# Patient Record
Sex: FEMALE | Race: WHITE | Employment: OTHER | URBAN - METROPOLITAN AREA
[De-identification: names, ages, dates, MRNs, and addresses within clinical notes are randomized per-mention and may not be internally consistent; named-entity substitution may affect disease eponyms.]

---

## 2019-04-22 ENCOUNTER — HOSPITAL ENCOUNTER (OUTPATIENT)
Dept: MRI IMAGING | Age: 69
Discharge: HOME | End: 2019-04-22
Attending: FAMILY MEDICINE
Payer: MEDICARE

## 2019-04-22 DIAGNOSIS — R15.9 INCONTINENCE OF FECES, UNSPECIFIED FECAL INCONTINENCE TYPE: ICD-10-CM

## 2019-04-22 DIAGNOSIS — M51.36 DDD (DEGENERATIVE DISC DISEASE), LUMBAR: ICD-10-CM

## 2019-04-22 PROCEDURE — 72148 MRI LUMBAR SPINE W/O DYE: CPT

## 2019-04-23 NOTE — PROGRESS NOTES
Call back MRI of the lumbar spine reveals multilevel arthritic changes without spinal stenosis but some mild abutment of the left L4 nerve root noted and mild narrowing at L3-L4 and L4-L5. Keep follow-up as planned.

## 2019-05-10 ENCOUNTER — HOSPITAL ENCOUNTER (OUTPATIENT)
Dept: LAB | Age: 69
Discharge: HOME | End: 2019-05-10
Payer: MEDICARE

## 2019-05-10 DIAGNOSIS — D72.819 LEUKOPENIA, UNSPECIFIED TYPE: ICD-10-CM

## 2019-05-10 DIAGNOSIS — D64.9 ANEMIA, UNSPECIFIED TYPE: ICD-10-CM

## 2019-05-10 DIAGNOSIS — E55.9 VITAMIN D DEFICIENCY: ICD-10-CM

## 2019-05-10 LAB
ALBUMIN SERPL-MCNC: 4 G/DL (ref 3.2–4.6)
ALBUMIN/GLOB SERPL: 1.3 {RATIO} (ref 1.2–3.5)
ALP SERPL-CCNC: 49 U/L (ref 50–136)
ALT SERPL-CCNC: 15 U/L (ref 12–65)
ANION GAP SERPL CALC-SCNC: 6 MMOL/L (ref 7–16)
AST SERPL-CCNC: 14 U/L (ref 15–37)
BASOPHILS # BLD: 0 K/UL (ref 0–0.2)
BASOPHILS NFR BLD: 1 % (ref 0–2)
BILIRUB SERPL-MCNC: 0.2 MG/DL (ref 0.2–1.1)
BUN SERPL-MCNC: 14 MG/DL (ref 8–23)
CALCIUM SERPL-MCNC: 9.2 MG/DL (ref 8.3–10.4)
CHLORIDE SERPL-SCNC: 107 MMOL/L (ref 98–107)
CO2 SERPL-SCNC: 28 MMOL/L (ref 21–32)
CREAT SERPL-MCNC: 1.09 MG/DL (ref 0.6–1)
DIFFERENTIAL METHOD BLD: ABNORMAL
EOSINOPHIL # BLD: 0 K/UL (ref 0–0.8)
EOSINOPHIL NFR BLD: 1 % (ref 0.5–7.8)
ERYTHROCYTE [DISTWIDTH] IN BLOOD BY AUTOMATED COUNT: 12.8 % (ref 11.9–14.6)
FERRITIN SERPL-MCNC: 38 NG/ML (ref 8–388)
GLOBULIN SER CALC-MCNC: 3.2 G/DL (ref 2.3–3.5)
GLUCOSE SERPL-MCNC: 84 MG/DL (ref 65–100)
HCT VFR BLD AUTO: 37.5 % (ref 35.8–46.3)
HGB BLD-MCNC: 12.6 G/DL (ref 11.7–15.4)
IMM GRANULOCYTES # BLD AUTO: 0 K/UL (ref 0–0.5)
IMM GRANULOCYTES NFR BLD AUTO: 0 % (ref 0–5)
LYMPHOCYTES # BLD: 1.4 K/UL (ref 0.5–4.6)
LYMPHOCYTES NFR BLD: 38 % (ref 13–44)
MCH RBC QN AUTO: 29.9 PG (ref 26.1–32.9)
MCHC RBC AUTO-ENTMCNC: 33.6 G/DL (ref 31.4–35)
MCV RBC AUTO: 89.1 FL (ref 79.6–97.8)
MONOCYTES # BLD: 0.4 K/UL (ref 0.1–1.3)
MONOCYTES NFR BLD: 12 % (ref 4–12)
NEUTS SEG # BLD: 1.8 K/UL (ref 1.7–8.2)
NEUTS SEG NFR BLD: 48 % (ref 43–78)
NRBC # BLD: 0 K/UL (ref 0–0.2)
PLATELET # BLD AUTO: 211 K/UL (ref 150–450)
PMV BLD AUTO: 10.1 FL (ref 9.4–12.3)
POTASSIUM SERPL-SCNC: 4.3 MMOL/L (ref 3.5–5.1)
PROT SERPL-MCNC: 7.2 G/DL (ref 6.3–8.2)
RBC # BLD AUTO: 4.21 M/UL (ref 4.05–5.25)
SODIUM SERPL-SCNC: 141 MMOL/L (ref 136–145)
WBC # BLD AUTO: 3.7 K/UL (ref 4.3–11.1)

## 2019-05-10 PROCEDURE — 82652 VIT D 1 25-DIHYDROXY: CPT

## 2019-05-10 PROCEDURE — 84165 PROTEIN E-PHORESIS SERUM: CPT

## 2019-05-10 PROCEDURE — 86334 IMMUNOFIX E-PHORESIS SERUM: CPT

## 2019-05-10 PROCEDURE — 85025 COMPLETE CBC W/AUTO DIFF WBC: CPT

## 2019-05-10 PROCEDURE — 80053 COMPREHEN METABOLIC PANEL: CPT

## 2019-05-10 PROCEDURE — 36415 COLL VENOUS BLD VENIPUNCTURE: CPT

## 2019-05-10 PROCEDURE — 82728 ASSAY OF FERRITIN: CPT

## 2019-05-13 LAB
1,25(OH)2D3 SERPL-MCNC: 18.3 PG/ML (ref 19.9–79.3)
ALBUMIN SERPL ELPH-MCNC: 4.04 G/DL (ref 3.2–5.6)
ALBUMIN/GLOB SERPL: 1.4 {RATIO}
ALPHA1 GLOB SERPL ELPH-MCNC: 0.3 G/DL (ref 0.1–0.4)
ALPHA2 GLOB SERPL ELPH-MCNC: 0.86 G/DL (ref 0.4–1.2)
B-GLOBULIN SERPL QL ELPH: 1.01 G/DL (ref 0.6–1.3)
GAMMA GLOB MFR SERPL ELPH: 0.79 G/DL (ref 0.5–1.6)
IGA SERPL-MCNC: 113 MG/DL (ref 85–499)
IGG SERPL-MCNC: 621 MG/DL (ref 610–1616)
IGM SERPL-MCNC: 34 MG/DL (ref 35–242)
M PROTEIN SERPL ELPH-MCNC: ABNORMAL G/DL
PROT PATTERN SERPL ELPH-IMP: ABNORMAL
PROT PATTERN SPEC IFE-IMP: ABNORMAL
PROT SERPL-MCNC: 7 G/DL (ref 6.3–8.2)

## 2019-05-14 PROBLEM — E55.9 VITAMIN D DEFICIENCY: Status: ACTIVE | Noted: 2019-05-14

## 2020-01-09 PROBLEM — K58.2 IRRITABLE BOWEL SYNDROME WITH BOTH CONSTIPATION AND DIARRHEA: Status: ACTIVE | Noted: 2020-01-09

## 2020-01-09 PROBLEM — N39.41 URGE INCONTINENCE: Status: ACTIVE | Noted: 2020-01-09

## 2020-01-09 PROBLEM — N81.10 BLADDER PROLAPSE, FEMALE, ACQUIRED: Status: ACTIVE | Noted: 2020-01-09

## 2020-06-15 PROBLEM — J30.9 ALLERGIC RHINITIS: Status: ACTIVE | Noted: 2020-06-15

## 2020-06-15 PROBLEM — M51.36 DDD (DEGENERATIVE DISC DISEASE), LUMBAR: Status: ACTIVE | Noted: 2020-06-15

## 2020-06-15 PROBLEM — E87.6 HYPOKALEMIA: Status: ACTIVE | Noted: 2020-06-15

## 2020-06-15 PROBLEM — M25.562 CHRONIC PAIN OF LEFT KNEE: Status: ACTIVE | Noted: 2020-06-15

## 2020-06-15 PROBLEM — G89.29 CHRONIC PAIN OF LEFT KNEE: Status: ACTIVE | Noted: 2020-06-15

## 2021-06-08 PROBLEM — L20.81 ATOPIC NEURODERMATITIS: Status: ACTIVE | Noted: 2021-06-08

## 2021-06-08 PROBLEM — K44.9 HIATAL HERNIA: Status: ACTIVE | Noted: 2021-06-08

## 2021-06-08 PROBLEM — M19.042 PRIMARY OSTEOARTHRITIS OF BOTH HANDS: Status: ACTIVE | Noted: 2021-06-08

## 2021-06-08 PROBLEM — M25.50 POLYARTHRALGIA: Status: ACTIVE | Noted: 2021-06-08

## 2021-06-08 PROBLEM — M19.041 PRIMARY OSTEOARTHRITIS OF BOTH HANDS: Status: ACTIVE | Noted: 2021-06-08

## 2021-06-08 PROBLEM — N81.10 BLADDER PROLAPSE, FEMALE, ACQUIRED: Status: RESOLVED | Noted: 2020-01-09 | Resolved: 2021-06-08

## 2021-06-08 PROBLEM — K59.09 CHRONIC CONSTIPATION: Status: ACTIVE | Noted: 2021-06-08

## 2022-03-18 PROBLEM — K59.09 CHRONIC CONSTIPATION: Status: ACTIVE | Noted: 2021-06-08

## 2022-03-18 PROBLEM — L20.81 ATOPIC NEURODERMATITIS: Status: ACTIVE | Noted: 2021-06-08

## 2022-03-18 PROBLEM — E87.6 HYPOKALEMIA: Status: ACTIVE | Noted: 2020-06-15

## 2022-03-18 PROBLEM — J30.9 ALLERGIC RHINITIS: Status: ACTIVE | Noted: 2020-06-15

## 2022-03-18 PROBLEM — K58.2 IRRITABLE BOWEL SYNDROME WITH BOTH CONSTIPATION AND DIARRHEA: Status: ACTIVE | Noted: 2020-01-09

## 2022-03-19 PROBLEM — M25.562 CHRONIC PAIN OF LEFT KNEE: Status: ACTIVE | Noted: 2020-06-15

## 2022-03-19 PROBLEM — M19.041 PRIMARY OSTEOARTHRITIS OF BOTH HANDS: Status: ACTIVE | Noted: 2021-06-08

## 2022-03-19 PROBLEM — E55.9 VITAMIN D DEFICIENCY: Status: ACTIVE | Noted: 2019-05-14

## 2022-03-19 PROBLEM — M19.042 PRIMARY OSTEOARTHRITIS OF BOTH HANDS: Status: ACTIVE | Noted: 2021-06-08

## 2022-03-19 PROBLEM — K44.9 HIATAL HERNIA: Status: ACTIVE | Noted: 2021-06-08

## 2022-03-19 PROBLEM — M51.36 DDD (DEGENERATIVE DISC DISEASE), LUMBAR: Status: ACTIVE | Noted: 2020-06-15

## 2022-03-19 PROBLEM — G89.29 CHRONIC PAIN OF LEFT KNEE: Status: ACTIVE | Noted: 2020-06-15

## 2022-03-20 PROBLEM — M25.50 POLYARTHRALGIA: Status: ACTIVE | Noted: 2021-06-08

## 2022-03-20 PROBLEM — N39.41 URGE INCONTINENCE: Status: ACTIVE | Noted: 2020-01-09

## 2022-05-24 ENCOUNTER — TELEPHONE (OUTPATIENT)
Dept: FAMILY MEDICINE CLINIC | Facility: CLINIC | Age: 72
End: 2022-05-24

## 2022-05-24 NOTE — TELEPHONE ENCOUNTER
Pt called and asked to be called back. Pt states that she is in [address] [facility] for a heart attack and seizures. Requesting to move her appointment up.  Tried calling pt and had to leave message to call back

## 2022-05-25 NOTE — TELEPHONE ENCOUNTER
Pt states that she is in [address] [place] [address] and does not know when she will be discharged and wanted an appointment. I notified pt that we can not make a Hospital Follow up until we know that she is being discharged.

## 2022-06-09 ENCOUNTER — HOME HEALTH ADMISSION (OUTPATIENT)
Dept: HOME HEALTH SERVICES | Facility: HOME HEALTH | Age: 72
End: 2022-06-09

## 2022-06-13 ENCOUNTER — TELEPHONE (OUTPATIENT)
Dept: FAMILY MEDICINE CLINIC | Facility: CLINIC | Age: 72
End: 2022-06-13

## 2022-06-13 NOTE — TELEPHONE ENCOUNTER
----- Message from Karlos Nagel sent at 6/13/2022 10:30 AM EDT -----  Subject: Message to Provider    QUESTIONS  Information for Provider? Pt is requesting for doctor Reardon to get   medical records from [facility] . Pt is now at [facility]   Acute rehab facility. Lance Spears ---------------------------------------------------------------------------  --------------  [place] INFO  What is the best way for the office to contact you? OK to leave message on   voicemail  Preferred Call Back Phone Number? [phone]  ---------------------------------------------------------------------------  --------------  SCRIPT ANSWERS  Relationship to Patient?  Self

## 2022-06-14 NOTE — TELEPHONE ENCOUNTER
Spoke with pt and let her know that the her records are in the system. Pt wanted to let Terrence Perez know that she is in Rehab at the time.

## 2022-06-15 ENCOUNTER — TELEPHONE (OUTPATIENT)
Dept: CARDIOLOGY CLINIC | Age: 72
End: 2022-06-15

## 2022-06-15 NOTE — TELEPHONE ENCOUNTER
Patient admitted to [facility]  11. Bipolar disorder: QT was prolonged.  Seroquel discontinued.  She was started on Depakote instead.  Xanax was discontinued. Marylen Harden is started on Klonopin.  Needs to be weaned from benzodiazepines long-term. - follow up at rehab in 5-7 days, repeat EKG, if QTc improving, may consider to restart Seroquel , follow psych outpt.

## 2022-06-21 ENCOUNTER — TELEPHONE (OUTPATIENT)
Dept: FAMILY MEDICINE CLINIC | Facility: CLINIC | Age: 72
End: 2022-06-21

## 2022-06-21 NOTE — TELEPHONE ENCOUNTER
Pt was just release from [facility]. Pt is needing an appointment next week. Can pt see Lucian Sandy for this?

## 2022-07-07 ENCOUNTER — OFFICE VISIT (OUTPATIENT)
Dept: FAMILY MEDICINE CLINIC | Facility: CLINIC | Age: 72
End: 2022-07-07
Payer: COMMERCIAL

## 2022-07-07 VITALS
SYSTOLIC BLOOD PRESSURE: 96 MMHG | BODY MASS INDEX: 24.48 KG/M2 | OXYGEN SATURATION: 95 % | DIASTOLIC BLOOD PRESSURE: 51 MMHG | WEIGHT: 133 LBS | TEMPERATURE: 97.9 F | RESPIRATION RATE: 18 BRPM | HEIGHT: 62 IN | HEART RATE: 109 BPM

## 2022-07-07 DIAGNOSIS — I25.10 CORONARY ARTERY DISEASE INVOLVING NATIVE CORONARY ARTERY OF NATIVE HEART WITHOUT ANGINA PECTORIS: ICD-10-CM

## 2022-07-07 DIAGNOSIS — K58.2 IRRITABLE BOWEL SYNDROME WITH BOTH CONSTIPATION AND DIARRHEA: ICD-10-CM

## 2022-07-07 DIAGNOSIS — E78.5 DYSLIPIDEMIA: ICD-10-CM

## 2022-07-07 DIAGNOSIS — I21.4 NSTEMI (NON-ST ELEVATED MYOCARDIAL INFARCTION) (HCC): Primary | ICD-10-CM

## 2022-07-07 DIAGNOSIS — R33.8 ACUTE URINARY RETENTION: ICD-10-CM

## 2022-07-07 DIAGNOSIS — E87.1 HYPONATREMIA: ICD-10-CM

## 2022-07-07 DIAGNOSIS — F31.9 BIPOLAR AFFECTIVE DISORDER, REMISSION STATUS UNSPECIFIED (HCC): ICD-10-CM

## 2022-07-07 DIAGNOSIS — E55.9 VITAMIN D DEFICIENCY: ICD-10-CM

## 2022-07-07 DIAGNOSIS — G40.909 SEIZURE DISORDER (HCC): ICD-10-CM

## 2022-07-07 DIAGNOSIS — H04.122 DRY EYE OF LEFT SIDE: ICD-10-CM

## 2022-07-07 DIAGNOSIS — R29.6 FREQUENT FALLS: ICD-10-CM

## 2022-07-07 DIAGNOSIS — R15.9 INCONTINENCE OF FECES, UNSPECIFIED FECAL INCONTINENCE TYPE: ICD-10-CM

## 2022-07-07 DIAGNOSIS — F41.9 ANXIETY: ICD-10-CM

## 2022-07-07 DIAGNOSIS — E87.6 HYPOKALEMIA: ICD-10-CM

## 2022-07-07 DIAGNOSIS — Z86.16 PERSONAL HISTORY OF COVID-19: ICD-10-CM

## 2022-07-07 DIAGNOSIS — I10 ESSENTIAL HYPERTENSION: ICD-10-CM

## 2022-07-07 DIAGNOSIS — K21.00 GASTROESOPHAGEAL REFLUX DISEASE WITH ESOPHAGITIS WITHOUT HEMORRHAGE: ICD-10-CM

## 2022-07-07 DIAGNOSIS — R32 URINARY INCONTINENCE, UNSPECIFIED TYPE: ICD-10-CM

## 2022-07-07 LAB
ANION GAP SERPL CALC-SCNC: 7 MMOL/L (ref 7–16)
BUN SERPL-MCNC: 13 MG/DL (ref 8–23)
CALCIUM SERPL-MCNC: 9.7 MG/DL (ref 8.3–10.4)
CHLORIDE SERPL-SCNC: 106 MMOL/L (ref 98–107)
CO2 SERPL-SCNC: 26 MMOL/L (ref 21–32)
CREAT SERPL-MCNC: 0.8 MG/DL (ref 0.6–1)
GLUCOSE SERPL-MCNC: 119 MG/DL (ref 65–100)
POTASSIUM SERPL-SCNC: 4.1 MMOL/L (ref 3.5–5.1)
SODIUM SERPL-SCNC: 139 MMOL/L (ref 136–145)

## 2022-07-07 PROCEDURE — 99215 OFFICE O/P EST HI 40 MIN: CPT | Performed by: FAMILY MEDICINE

## 2022-07-07 PROCEDURE — 1123F ACP DISCUSS/DSCN MKR DOCD: CPT | Performed by: FAMILY MEDICINE

## 2022-07-07 RX ORDER — CLONAZEPAM 1 MG/1
1 TABLET ORAL 2 TIMES DAILY
COMMUNITY
Start: 2022-06-10 | End: 2022-07-07 | Stop reason: SDUPTHER

## 2022-07-07 RX ORDER — DICYCLOMINE HYDROCHLORIDE 10 MG/1
CAPSULE ORAL
COMMUNITY
End: 2022-07-07 | Stop reason: SDUPTHER

## 2022-07-07 RX ORDER — ACETAMINOPHEN 325 MG/1
650 TABLET ORAL EVERY 8 HOURS PRN
COMMUNITY
Start: 2022-06-10

## 2022-07-07 RX ORDER — POTASSIUM CHLORIDE 750 MG/1
TABLET, FILM COATED, EXTENDED RELEASE ORAL
Qty: 90 TABLET | Refills: 3 | Status: SHIPPED | OUTPATIENT
Start: 2022-07-07

## 2022-07-07 RX ORDER — CLONAZEPAM 1 MG/1
1 TABLET ORAL 2 TIMES DAILY
Qty: 60 TABLET | Refills: 5 | Status: SHIPPED | OUTPATIENT
Start: 2022-07-07 | End: 2022-07-11 | Stop reason: SDUPTHER

## 2022-07-07 RX ORDER — CHOLECALCIFEROL (VITAMIN D3) 125 MCG
5 CAPSULE ORAL
COMMUNITY
Start: 2022-06-10

## 2022-07-07 RX ORDER — ZONISAMIDE 100 MG/1
200 CAPSULE ORAL 2 TIMES DAILY
COMMUNITY
Start: 2022-06-10 | End: 2022-07-08 | Stop reason: SDUPTHER

## 2022-07-07 RX ORDER — DICYCLOMINE HYDROCHLORIDE 10 MG/1
10 CAPSULE ORAL 2 TIMES DAILY
Qty: 180 CAPSULE | Refills: 3 | Status: SHIPPED | OUTPATIENT
Start: 2022-07-07

## 2022-07-07 RX ORDER — ERGOCALCIFEROL (VITAMIN D2) 1250 MCG
50000 CAPSULE ORAL
Qty: 12 CAPSULE | Refills: 3 | Status: SHIPPED | OUTPATIENT
Start: 2022-07-07

## 2022-07-07 RX ORDER — LISINOPRIL 5 MG/1
5 TABLET ORAL DAILY
Qty: 90 TABLET | Refills: 3 | Status: SHIPPED | OUTPATIENT
Start: 2022-07-07

## 2022-07-07 RX ORDER — ASPIRIN 81 MG/1
81 TABLET ORAL DAILY
COMMUNITY
Start: 2022-06-11 | End: 2022-07-11

## 2022-07-07 RX ORDER — ROSUVASTATIN CALCIUM 20 MG/1
20 TABLET, COATED ORAL NIGHTLY
Qty: 90 TABLET | Refills: 3 | Status: SHIPPED | OUTPATIENT
Start: 2022-07-07

## 2022-07-07 RX ORDER — OMEPRAZOLE 40 MG/1
40 CAPSULE, DELAYED RELEASE ORAL DAILY
Qty: 90 CAPSULE | Refills: 3 | Status: SHIPPED | OUTPATIENT
Start: 2022-07-07

## 2022-07-07 NOTE — PROGRESS NOTES
[address] [facility] [address]  Phone: [phone] Fax [phone]  Yehuda Rodriguez M.D.  7/7/2022        Benny Hodges is a 66 y.o. female     HPI:  Patient is seen for Follow-Up from Hospital (Seizure that lead to heart issue, fallen 5 times before going to the ER 5/21/22, Rehab [facility] for 10 day, home for 16 days, ) and Other (Has Zina at Home coming out)  Patient is accompanied by her  today. She was admitted to [facility] on May 21 and was discharged on Madhav[facility] 10. She has been falling frequently just prior to going to the ER. On the day of the ER evaluation and hospital admission, she had fallen off the toilet without loss of consciousness. She did however have increasing altered mental status. In the ER setting she was noted to have elevated troponin with evidence for NSTEMI. Cardiology was consulted with plans for medical management only. Goal was to add ACE inhibitor and beta-blocker when blood pressure allows. Continue statin therapy started. Hypotension during hospitalization resolved as well as noted heart failure with IV fluid hydration and use of Lasix. Patient had CT of the head and spine without acute abnormalities noted. Her  had noted episodes of her staring with concern for seizure activity. Patient had EEG performed on May 24 that was noted to be nonspecific without epileptic waveforms. Neuro was consulted with concern for psychogenic seizure activity. Her prior Xanax 4 mg twice daily was changed to Klonopin taper with resulting 1 mg twice daily stabilization. She has history of underlying bipolar disorder and her Seroquel was stopped secondary to prolonged QT interval noted. EEG was repeated on 26 May and overnight that night with 45 episodes of epileptic activity documented. She had been started on Keppra initially and this was increased.   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 Her  does report that she would move her arm spasmodically and her head at times however during that EEG. With the Seroquel having been stopped, she was changed to Depakote but this resulted in hyponatremia/SIADH for which she was kept in the ICU with Depakote tapered off. She was subsequently placed on zonisamide 200 mg twice daily without further noted seizure activity by report. During hospitalization patient developed acute urinary retention with Novak catheter placed and this attempted to be removed prior to discharge but unable to successfully be done. At time of discharge to postacute rehab, she still had indwelling catheter in place. Was told she would need follow-up sodium testing as well as follow-up with nephrology and neurology. Patient was in the postacute care until 16 days ago by report. During that time in postacute care, her Novak catheter was able to be discontinued and she has been able to urinate without in and out catheterization. However she has no urinary and fecal incontinence and her  has been having her in a diaper and changing this regularly. Currently home health is coming out once weekly. Physical therapy has been discontinued and patient's  states that he has been trying to continue her cardiac rehab activities. She is ambulating some with a walker. No recent falls reported. Appetite has been fair. She and her  report that they do not want to follow-up with any further specialists. Able to reduce all doctor visits and only come here for primary care. Patient's  desires to keep her at home. He reports they have decided that she will not follow-up with cardiology, neurology, psychiatry, nephrology, etc.  He and his wife are amenable to palliative care which was mentioned by KELLE with card provided but he prefers this potentially through [facility].   Agrees for this No masses palpated. No CVA tenderness. No peripheral edema or cyanosis. Moves all extremities slowly but fairly well.  is equal bilateral.  Able to flex and extend the thighs at the hips but more difficulty against resistance. Able to flex and extend the feet at the ankles. Assessment and Plan:   Diagnosis Orders   1. NSTEMI (non-ST elevated myocardial infarction) [facility])  External Referral to Palliative Medicine   2. Seizure disorder [facility])  External Referral to Palliative Medicine   3. Urinary incontinence, unspecified type  External Referral to Palliative Medicine   4. Incontinence of feces, unspecified fecal incontinence type  External Referral to Palliative Medicine   5. Hyponatremia  Basic Metabolic Panel   6. Dry eye of left side     7. Acute urinary retention     8. Essential hypertension  lisinopril (PRINIVIL;ZESTRIL) 5 MG tablet   9. Dyslipidemia  rosuvastatin (CRESTOR) 20 MG tablet   10. Vitamin D deficiency  ergocalciferol (ERGOCALCIFEROL) 1.25 MG (08543 UT) capsule   11. Irritable bowel syndrome with both constipation and diarrhea  dicyclomine (BENTYL) 10 MG capsule   12. Bipolar affective disorder, remission status unspecified ([address]  External Referral to Palliative Medicine   13. Anxiety  clonazePAM (KLONOPIN) 1 MG tablet   14. Gastroesophageal reflux disease with esophagitis without hemorrhage  omeprazole (PRILOSEC) 40 MG delayed release capsule   15. Hypokalemia  potassium chloride (KLOR-CON) 10 MEQ extended release tablet   16. Frequent falls     17. Coronary artery disease involving native coronary artery of native heart without angina pectoris  External Referral to Palliative Medicine   18. Personal history of COVID-19       Information on cardiac rehab, epilepsy, hyponatremia, bladder training, fall prevention, esophagitis, fecal incontinence diet, dry eyes, anxiety disorder, and bipolar disorder provided.   Reviewed with patient in great detail her recent hospitalization and postacute rehab. Have referred patient for palliative care evaluation with patient and her  not desiring to follow-up with any further specialist and patient's  desiring to care for his wife at home but with significant underlying comorbidities including coronary disease, seizure disorder, incontinence of bowel and bladder, bipolar disorder, and recent electrolyte abnormalities. Did encourage patient and her  to follow-up at least with neurology but this declined at this time. Even mentioned that I am not well versed on Zonegran antiseizure medication but this reportedly working well with desire for this to be continued. Continue with cardiac rehab therapies. Refilled other medications as above. Consider addition of beta-blocker low-dose in the near future if blood pressure allows. Goal of  reportedly is to reduce total doctor office visits significantly at least no more than every 6 months secondary to the difficulty reported with coercing patient to leave the home. Furthermore discussed patient and her 's desire not to follow-up with a psychiatrist at this time as they did not desire to go back and see Dr. Lazara Rider or anyone else. Reviewed lab work from recent hospitalization and subsequently. Await repeat BMP from today specifically in regards to prior noted hyponatremia. If potassium once again low, may need to increase from 1 tablet to 2 tablets daily. If breakthrough heartburn or reflux, may need to increase Protonix back to twice daily from written once daily today. May use natural tears for dry eye. Plan reassessment here in 3 months or more quickly as needed. Spent 1 hour today in examination, evaluation, and documentation.      Discharge Meds:  Current Outpatient Medications   Medication Sig Dispense Refill    ergocalciferol (ERGOCALCIFEROL) 1.25 MG (88333 UT) capsule Take 1 capsule by mouth every 7 days 12 capsule 3    potassium chloride (KLOR-CON) 10 MEQ extended release tablet Take 1 tablet by mouth daily 90 tablet 3    omeprazole (PRILOSEC) 40 MG delayed release capsule Take 1 capsule by mouth daily 90 capsule 3    acetaminophen (TYLENOL) 325 MG tablet Take 650 mg by mouth every 8 hours as needed      aspirin 81 MG EC tablet Take 81 mg by mouth daily      melatonin 5 MG TABS tablet Take 5 mg by mouth      zonisamide (ZONEGRAN) 100 MG capsule Take 200 mg by mouth 2 times daily      rosuvastatin (CRESTOR) 20 MG tablet Take 1 tablet by mouth nightly 90 tablet 3    clonazePAM (KLONOPIN) 1 MG tablet Take 1 tablet by mouth 2 times daily for 180 days. 60 tablet 5    dicyclomine (BENTYL) 10 MG capsule Take 1 capsule by mouth in the morning and at bedtime 180 capsule 3    lisinopril (PRINIVIL;ZESTRIL) 5 MG tablet Take 1 tablet by mouth daily 90 tablet 3    fluticasone (FLONASE) 50 MCG/ACT nasal spray 2 sprays by Nasal route daily      loratadine (CLARITIN) 10 MG tablet Take 10 mg by mouth daily       No current facility-administered medications for this visit. I have reviewed this patient's report generated by the Prescription Monitoring Program which does not demonstrate aberrancies or inconsistencies with regard to the historical prescribing of controlled medications to this patient by other providers. Return in about 3 months (around 10/7/2022). Any new medications were explained today including any potential drug interactions or significant side effects. The patient's questions in regards to this were answered today. Dictated using voice recognition software.   Proofread, but unrecognized errors may exist.

## 2022-07-07 NOTE — PATIENT INSTRUCTIONS
Patient Education        Learning About Anxiety Disorders  What are anxiety disorders? Anxiety disorders are a type of medical problem. They cause severe anxiety. When you feel anxious, you feel that something bad is about to happen. Thisfeeling interferes with your life. These disorders include:   Generalized anxiety disorder. You feel worried and stressed about many everyday events and activities. This goes on for several months and disrupts your life on most days.  Panic disorder. You have repeated panic attacks. A panic attack is a sudden, intense fear or anxiety. It may make you feel short of breath. Your heart may pound.  Social anxiety disorder. You feel very anxious about what you will say or do in front of people. For example, you may be scared to talk or eat in public. This problem affects your daily life.  Phobias. You are very scared of a specific object, situation, or activity. For example, you may fear spiders, high places, or small spaces. What are the symptoms? Generalized anxiety disorder  Symptoms may include:   Feeling worried and stressed about many things almost every day.  Feeling tired or irritable. You may have a hard time concentrating.  Having headaches or muscle aches.  Having a hard time getting to sleep or staying asleep. Panic disorder  You may have repeated panic attacks when there is no reason for feeling afraid. You may change your daily activities because you worry that you will haveanother attack. Symptoms may include:   Intense fear, terror, or anxiety.  Trouble breathing or very fast breathing.  Chest pain or tightness.  A heartbeat that races or is not regular. Social anxiety disorder  Symptoms may include:   Fear about a social situation, such as eating in front of others or speaking in public. You may worry a lot. Or you may be afraid that something bad will happen.  Anxiety that can cause you to blush, sweat, and feel shaky.    A heartbeat that is faster than normal.   A hard time focusing. Phobias  Symptoms may include:   More fear than most people of being around an object, being in a situation, or doing an activity. You might also be stressed about the chance of being around the thing you fear.  Worry about losing control, panicking, fainting, or having physical symptoms like a faster heartbeat when you are around the situation or object. How are these disorders treated? Anxiety disorders can be treated with medicines or counseling. A combination ofboth may be used. Medicines may include:   Antidepressants. These may help your symptoms by keeping chemicals in your brain in balance.  Benzodiazepines. These may give you short-term relief of your symptoms. Some people use cognitive-behavioral therapy. A therapist helps you learn tochange stressful or bad thoughts into helpful thoughts. Lead a healthy lifestyle  A healthy lifestyle may help you feel better.  Get at least 30 minutes of exercise on most days of the week. Walking is a good choice.  Eat a healthy diet. Include fruits, vegetables, lean proteins, and whole grains in your diet each day.  Try to go to bed at the same time every night. Try for 8 hours of sleep a night.  Find ways to manage stress. Try relaxation exercises.  Avoid alcohol and illegal drugs. Follow-up care is a key part of your treatment and safety. Be sure to make and go to all appointments, and call your doctor if you are having problems. It's also a good idea to know your test results and keep alist of the medicines you take. Where can you learn more? Go to https://mercedes.Servoyant. org and sign in to your Living Indie account. Enter O047 in the [facility] box to learn more about \"Learning About Anxiety Disorders. \"     If you do not have an account, please click on the \"Sign Up Now\" link.   Current as of: February 9, 2022               Content Version: 13.3  © 3482-8789 Healthwise, Incorporated. Care instructions adapted under license by [facility] ([facility]). If you have questions about a medical condition or this instruction, always ask your healthcare professional. [place] [address] any warranty or liability for your use of this information. Patient Education        Bipolar Disorder: Care Instructions  Your Care Instructions     Bipolar disorder is an illness that causes extreme mood changes, from times of very high energy (manic episodes) to times of depression. But many people with bipolar disorder show only the symptoms of depression. These moods may cause problems with your work, school, family life, friendships, and how well youfunction. This disease is also called manic-depression. There is no cure for bipolar disorder, but it can be helped with medicines. Counseling may also help. It is important to take your medicines exactly asprescribed, even when you feel well. You may need lifelong treatment. Follow-up care is a key part of your treatment and safety. Be sure to make and go to all appointments, and call your doctor if you are having problems. It's also a good idea to know your test results and keep alist of the medicines you take. How can you care for yourself at home?  Be safe with medicines. Take your medicines exactly as prescribed. Do not stop or change a medicine without talking to your doctor first. Mariana Grant and your doctor may need to try different combinations of medicines to find what works for you.  Take your medicines on schedule to keep your moods even. When you feel good, you may think that you do not need your medicines. But it is important to keep taking them.  Go to your counseling sessions. Call and talk with your counselor if you can't go to a session or if you don't think the sessions are helping. Do not just stop going.  Get at least 30 minutes of activity on most days of the week. Walking is a good choice.  You also may want to do other things, such as running, swimming, or cycling.  Get enough sleep. Keep your room dark and quiet. Try to go to bed at the same time every night.  Eat a healthy diet. This includes whole grains, dairy, fruits, vegetables, and protein. Eat foods from each of these groups.  Try to lower your stress. Manage your time, build a strong support system, and lead a healthy lifestyle. To lower your stress, try physical activity, slow deep breathing, or getting a massage.  Do not use alcohol, marijuana, or illegal drugs.  Learn the early signs of your mood changes. You can then take steps to help yourself feel better.  Ask for help from friends and family when you need it. You may need help with daily chores when you are depressed. When you are manic, you may need support to control your high energy levels. What should you do if someone in your family has bipolar disorder?  Learn about the disease and signs it's getting worse.  Remind your family member you love them.  Make a plan with all family members about how to take care of your loved one when symptoms are bad.  Remind yourself it will take time for changes to occur.  Try not to blame yourself for the disease.  Know your legal rights and the legal rights of your family member. Support groups or counselors can help with this information.  Take care of yourself. Keep up with your interests, such as career, hobbies, and friends. Use exercise, positive self-talk, deep breathing, and other relaxing exercises to help lower your stress.  Give yourself time to grieve. You may need to deal with emotions such as anger, fear, and frustration.  If you are having a hard time with your feelings or with your relationship with your family member, talk with a counselor. When should you call for help? Call 911 anytime you think you may need emergency care.  For example, call if:     You feel like hurting yourself or someone else.      Someone who has liability for your use of this information. Patient Education        Bladder Training: Care Instructions  Your Care Instructions     Bladder training is used to treat urge incontinence and stress incontinence. Urge incontinence means that the need to urinate comes on so fast that you can't get to a toilet in time. Stress incontinence means that you leak urine because of pressure on your bladder. For example, it may happen when you laugh,cough, or lift something heavy. Bladder training can increase how long you can wait before you have to urinate. It can also help your bladder hold more urine. And it can give you bettercontrol over the urge to urinate. It is important to remember that bladder training takes a few weeks to a few months to make a difference. You may not see results right away, but don't giveup. Follow-up care is a key part of your treatment and safety. Be sure to make and go to all appointments, and call your doctor if you are having problems. It's also a good idea to know your test results and keep alist of the medicines you take. How can you care for yourself at home? Work with your doctor to come up with a bladder training program that is rightfor you. You may use one or more of the following methods. Delayed urination   In the beginning, try to keep from urinating for 5 minutes after you first feel the need to go.  While you wait, take deep, slow breaths to relax. Kegel exercises can also help you delay the need to go to the bathroom.  After some practice, when you can easily wait 5 minutes to urinate, try to wait 10 minutes before you urinate.  Slowly increase the waiting period until you are able to control when you have to urinate. Scheduled urination   Empty your bladder when you first wake up in the morning.  Schedule times throughout the day when you will urinate.  Start by going to the bathroom every hour, even if you don't need to go.    Slowly increase the time between trips to the bathroom.  When you have found a schedule that works well for you, keep doing it.  If you wake up during the night and have to urinate, do it. Apply your schedule to waking hours only. Kegel exercises  These tighten and strengthen pelvic muscles, which can help you control theflow of urine. To do Kegel exercises:   Squeeze the same muscles you would use to stop your urine. Your belly and thighs should not move.  Hold the squeeze for 3 seconds, and then relax for 3 seconds.  Start with 3 seconds. Then add 1 second each week until you are able to squeeze for 10 seconds.  Repeat the exercise 10 to 15 times a session. Do three or more sessions a day. When should you call for help? Watch closely for changes in your health, and be sure to contact your doctor if:     Your incontinence is getting worse.      You do not get better as expected. Where can you learn more? Go to https://Whitfield [email or web address]-[email or web address] [email or web address] [email or web address] org and sign in to your [employer] account. Enter O615 in the Watsi box to learn more about \"Bladder Training: Care Instructions. \"     If you do not have an account, please click on the \"Sign Up Now\" link. Current as of: October 18, 2021               Content Version: 13.3  © 2006-2022 Healthwise, SkySQL. Care instructions adapted under license by [facility] ([facility]). If you have questions about a medical condition or this instruction, always ask your healthcare professional. [place] [address] any warranty or liability for your use of this information. Patient Education        Learning About Cardiac Rehabilitation  What is it? Cardiac rehabilitation (rehab) is a program for people who have a heart problem. It teaches you how to be more active and have a heart-healthylifestyle. This can lead to a stronger heart and better health. Why is cardiac rehab done?   Cardiac rehab may help you to:   Have better overall health and a better rehab, your doctor will check your heart health to see what types of exercises you can safely do. Tests may include electrocardiograms and echocardiograms. You may also have tests during rehab. They will help yourdoctor see how you are doing. Where can you learn more? Go to [email or web address] org and sign in to your [employer] account. Enter 96 [id number] in the [facility] box to learn more about \"Learning About Cardiac Rehabilitation. \"     If you do not have an account, please click on the \"Sign Up Now\" link. Current as of: January 10, 2022               Content Version: 13.3  © 8527-8873 [employer] R [employer]. Care instructions adapted under license by [facility] ([facility]). If you have questions about a medical condition or this instruction, always ask your healthcare professional. [place] [address] any warranty or liability for your use of this information. Patient Education        Epilepsy: Care Instructions  Overview     Epilepsy is a common condition that causes repeated seizures. The seizures are caused by bursts of electrical activity in the brain that aren't normal. Seizures may cause problems with muscle control, movement, speech, vision, orawareness. They can be scary. Epilepsy affects each person differently. Some people have only a few seizures. Others get them more often. If you know what triggers a seizure, you may beable to avoid having one. You can take medicines to control and reduce seizures. You and your doctor will need to find the right combination, schedule, and dose of medicine. This maytake time and careful changes. Follow-up care is a key part of your treatment and safety. Be sure to make and go to all appointments, and call your doctor if you are having problems. It's also a good idea to know your test results and keep alist of the medicines you take. How can you care for yourself at home?   To help control your seizures, follow your treatment plan. If you take medicineto control seizures, take it exactly as prescribed. The medicine works best if you take the right amount on the schedule your doctor sets up. Following this schedule keeps the right level of medicine inyour body. Even missing just a few doses can allow seizures to happen. You might be on a special ketogenic diet. If so, follow the diet carefully. As you follow your treatment plan, also try to figure out and avoid things thatmay make you more likely to have a seizure. These may include:   Not getting enough sleep.  Using drugs or alcohol.  Being stressed.  Skipping meals. If you keep having seizures despite treatment, keep a record of them. Note the date, time of day, and any details about the seizure that you can remember. Your doctor can use this information to plan or adjust your medicine or other treatment. The record can also help your doctor find out what kinds of seizuresyou are having. If you have epilepsy:   Be sure that any doctor who treats you knows that you have epilepsy. And let the doctor know what medicines you take, if any.  Wear a medical ID bracelet. If you have a seizure or accident that leaves you unconscious or unable to speak for yourself, the bracelet will let those who are treating you know that you have epilepsy. It will also list any medicines you take to control your seizures. That way, you won't be given any medicines that will react badly with those already in your body.  Ask your doctor if it's safe for you to do things like drive or swim.  Create a seizure first-aid plan with your friends and family. The plan will help them know how to help you. The kind of plan you need can depend on the kind of seizures you have. Your doctor can tell you more about this. When should you call for help? Call 911 anytime you think you may need emergency care.  For example, call if:     A seizure does not stop as it normally does.      You have new symptoms such as:  ? Numbness, tingling, or weakness on one side of your body or face. ? Vision changes. ? Trouble speaking or thinking clearly. Call your doctor now or seek immediate medical care if:     You have a fever.      You have a severe headache. Watch closely for changes in your health, and be sure to contact your doctor if:     The normal pattern or features of your seizures change. Where can you learn more? Go to [email or web address]://[email or web address] and sign in to your ArcMail account. Enter J752 in the RotoHog box to learn more about \"Epilepsy: Care Instructions. \"     If you do not have an account, please click on the \"Sign Up Now\" link. Current as of: December 13, 2021               Content Version: 13.3  © 8472-6778 Albireo. Care instructions adapted under license by [facility] ([facility]). If you have questions about a medical condition or this instruction, always ask your healthcare professional. [place] [address] any warranty or liability for your use of this information. Patient Education        Esophagitis: Care Instructions  Your Care Instructions     Esophagitis (say \"ih-sof-uh-JY-tus\") is irritation of the esophagus, the tubethat carries food from your throat to your stomach. Acid reflux is the most common cause of this condition. When you have reflux, stomach acid and juices flow upward. This can cause pain or a burning feelingin your chest. You may have a sore throat. It may be hard to swallow. Other causes of this condition include some medicines and supplements. Allergies or an infection can also cause it. Your doctor will ask about your symptoms and past health. He or she might dotests to find the cause of your symptoms. Treatment depends on what is causing the problem. Treatment might include changing your diet or taking medicine to relieve your symptoms.  It might alsoinclude changing a medicine that is causing your symptoms. If you have reflux, medicine that reduces the stomach acid helps your bodyheal. It might take 1 to 3 weeks to heal.  Follow-up care is a key part of your treatment and safety. Be sure to make and go to all appointments, and call your doctor if you are having problems. It's also a good idea to know your test results and keep alist of the medicines you take. How can you care for yourself at home?  If you have acid reflux, your doctor may recommend that you:  ? Eat several small meals instead of two or three large meals. After you eat, wait 2 to 3 hours before you lie down. ? Avoid chocolate, mint, alcohol, and spicy foods. ? Don't smoke or use smokeless tobacco. Smoking can make this condition worse. If you need help quitting, talk to your doctor about stop-smoking programs and medicines. These can increase your chances of quitting for good. ? Raise the head of your bed 6 to 8 inches if you have symptoms at night. ? Lose weight if you are overweight. ? Take an over-the-counter antacid, such as Maalox, Mylanta, or Tums. Be careful when you take over-the-counter antacid medicines. Many of these medicines have aspirin in them. Read the label to make sure that you are not taking more than the recommended dose. Too much aspirin can be harmful. ? Take stronger acid reducers. Examples are famotidine (such as Pepcid) and omeprazole (such as Prilosec).  If your condition is caused by infection, allergy, or other problems, use the medicine or treatments that your doctor recommends.  Be safe with medicines. Take your medicines exactly as prescribed. Call your doctor if you think you are having a problem with your medicine. When should you call for help? Call your doctor now or seek immediate medical care if:     You have new or worse belly pain.      You are vomiting. Watch closely for changes in your health, and be sure to contact your doctor if:     You have new or worse symptoms of reflux.    You have trouble or pain swallowing.      You are losing weight.      You do not get better as expected. Where can you learn more? Go to [email or web address]://[email or web address].Company Data [email or web address]. org and sign in to your EnSol account. Enter Y901 in the [facility] box to learn more about \"Esophagitis: Care Instructions. \"     If you do not have an account, please click on the \"Sign Up Now\" link. Current as of: September 8, 2021               Content Version: 13.3  © 7683-7959 Forkforce. Care instructions adapted under license by [facility] ([facility]). If you have questions about a medical condition or this instruction, always ask your healthcare professional. [place] [address] any warranty or liability for your use of this information. Patient Education        Diet for Fecal Incontinence: Care Instructions  Overview     Fecal incontinence is the loss of normal control of your bowels. You may not be able to reach the toilet in time for a bowel movement. Or stool may leak fromyour anus. This problem can be caused by constipation or diarrhea. Anxiety or other emotional stress can be a cause too. It can also result from nerve injury,muscle damage (especially from childbirth), lack of exercise, or poor diet. What you eat can help you manage fecal incontinence. Which foods you eat oravoid may depend on why you have the problem. Follow-up care is a key part of your treatment and safety. Be sure to make and go to all appointments, and call your doctor if you are having problems. It's also a good idea to know your test results and keep alist of the medicines you take. How can you care for yourself at home?  Keep a food diary of what you eat. This can help you find out which foods may help or cause this problem.  Eat small, frequent meals.    If you and your doctor feel that constipation is part of the problem:  ? Include fruits, vegetables, beans, and whole grains in your diet each day. These foods are high in fiber. ? Drink plenty of fluids. If you have kidney, heart, or liver disease and have to limit fluids, talk with your doctor before you increase the amount of fluids you drink. ? Get some exercise every day. Build up slowly to 30 to 60 minutes a day on 5 or more days of the week. ? Take a fiber supplement, such as Citrucel or Metamucil, every day. Read and follow all instructions on the label.  If you and your doctor feel that diarrhea is part of the problem, try to avoid:  ? Alcohol. ? Caffeine. This is found in coffee, tea, cola drinks, and chocolate. ? Nicotine, from smoking or chewing tobacco.  ? Gas-producing foods. These include beans, broccoli, cabbage, and apples. ? Dairy products that contain lactose (milk sugar). Examples are ice cream, milk, cheese, and sour cream.  ? Foods and drinks high in sugar, especially fruit juice, soda, candy, and other packaged sweets (such as cookies). ? Foods high in fat. These include wolfe, sausage, butter, oils, and anything deep-fried. ? Sorbitol and xylitol. These artificial sweeteners are found in some sugarless candies and chewing gum. Where can you learn more? Go to https://RocketrippeGeoloqi.ProductGram. org and sign in to your [employer] account. Enter U226 in the [facility] box to learn more about \"Diet for Fecal Incontinence: Care Instructions. \"     If you do not have an account, please click on the \"Sign Up Now\" link. Current as of: September 8, 2021               Content Version: 13.3  © 3244-7035 OLSET. Care instructions adapted under license by [facility] ([facility]). If you have questions about a medical condition or this instruction, always ask your healthcare professional. [place] [address] any warranty or liability for your use of this information. Patient Education        Dry Eyes: Care Instructions  Overview     Dry eyes can be uncomfortable.  The dryness may make your eyes feel dry or hot. Your eyes may also water a lot. In some cases, dry eyes make it feel like thereis sand or dirt in your eyes. From time to time, dry eyes may cause you to have blurry vision. But dry eyesdon't usually cause lasting problems with vision. There are many causes of dry eyes. Sometimes dry weather, smoke, or pollution can bother the eyes. Other times, allergies or contact lenses irritate the eyes. Older people often have dry eyes because our eyes do not make as many tears as we age. In some cases, diseases can cause dry eyes. These include rheumatoid arthritis, lupus, and Sjögren's syndrome. In other cases, medicines are to blame. Your doctor may want to do tests to help find the cause of yourdry eyes. You can work with your doctor to find ways to help your eyes feel better. Hometreatment often helps. Follow-up care is a key part of your treatment and safety. Be sure to make and go to all appointments, and call your doctor if you are having problems. It's also a good idea to know your test results and keep alist of the medicines you take. How can you care for yourself at home?  Take breaks often when you read, watch TV, or use a computer. Close your eyes. Do not rub your eyes. Artificial tears may help you when you do these activities. You can buy these without a prescription.  Avoid smoke and other things that irritate the eyes.  Wear sunglasses that wrap around the sides of the head. These can protect the eyes from sun, wind, dust, and dirt.  Use a vaporizer or humidifier to add moisture to your bedroom. Follow the directions for cleaning the machine.  Do not use fans while you sleep.  If you usually wear contact lenses, use rewetting drops or wear your glasses until your eyes feel better.  Be safe with medicines. Take your medicine exactly as prescribed. Call your doctor if you think you are having a problem with your medicine.    Try using artificial tears at least 4 times a day.   If you need drops more than 4 times a day, use artificial tears without preservatives. They may irritate the eyes less.  Use a lubricating eye ointment or eye gel at bedtime. These are thicker and last longer, so you may have less burning, dryness, and itching when you wake up. Be aware that they may blur your vision for a short time.  To put in eyedrops or ointment:  ? Tilt your head back, and pull the lower eyelid down with one finger. ? Drop or squirt the medicine inside the lower lid. ? Close your eye for 30 to 60 seconds to let the drops or ointment move around. ? Do not touch the ointment or dropper tip to your eyelashes or any other surface.  Put a warm, moist cloth on your eyelids every morning for about 5 minutes. Then massage your eyelids lightly. This helps increase the natural wetness of your eyes. When should you call for help? Watch closely for changes in your health, and be sure to contact your doctor if:     Your eyes are still dry, irritated, or teary, and artificial tears do not help.      Your vision changes.      You do not get better as expected. Where can you learn more? Go to [email or web address]://[email or web address] Mantra.[email or web address] org and sign in to your [employer] account. Enter D231 in the [employer] box to learn more about \"Dry Eyes: Care Instructions. \"     If you do not have an account, please click on the \"Sign Up Now\" link. Current as of: January 24, 2022               Content Version: 13.3  © 2006-2022 Healthwise, Incorporated. Care instructions adapted under license by [facility] ([facility]). If you have questions about a medical condition or this instruction, always ask your healthcare professional. [place] [address] any warranty or liability for your use of this information.          Patient Education        Preventing Falls: Care Instructions  Your Care Instructions     Getting around your home safely can be a challenge if you have injuries or health problems that make it easy for you to fall. Loose rugs and furniture in walkways are among the dangers for many older people who have problems walking or who have poor eyesight. People who have conditions such as arthritis,osteoporosis, or dementia also have to be careful not to fall. You can make your home safer with a few simple measures. Follow-up care is a key part of your treatment and safety. Be sure to make and go to all appointments, and call your doctor if you are having problems. It's also a good idea to know your test results and keep alist of the medicines you take. How can you care for yourself at home? Taking care of yourself   Exercise regularly to improve your strength, muscle tone, and balance. Walk if you can. Swimming may be a good choice if you cannot walk easily.  Have your vision and hearing checked each year or any time you notice a change. If you have trouble seeing and hearing, you might not be able to avoid objects and could lose your balance.  Know the side effects of the medicines you take. Ask your doctor or pharmacist whether the medicines you take can affect your balance. Sleeping pills or sedatives can affect your balance.  Limit the amount of alcohol you drink. Alcohol can impair your balance and other senses.  Ask your doctor whether calluses or corns on your feet need to be removed. If you wear loose-fitting shoes because of calluses or corns, you can lose your balance and fall.  Talk to your doctor if you have numbness in your feet.  You may get dizzy if you do not drink enough water. To prevent dehydration, drink plenty of fluids. Choose water and other clear liquids. If you have kidney, heart, or liver disease and have to limit fluids, talk with your doctor before you increase the amount of fluids you drink. Preventing falls at home   Remove raised doorway thresholds, throw rugs, and clutter. Repair loose carpet or raised areas in the floor.   [id number] [place] and electrical cords to keep them out of walking paths.  Use nonskid floor wax, and wipe up spills right away, especially on ceramic tile floors.  If you use a walker or cane, put rubber tips on it. If you use crutches, clean the bottoms of them regularly with an abrasive pad, such as steel wool.  Keep your house well lit, especially [facility], and outside walkways. Use night-lights in areas such as hallways and bathrooms. Add extra light switches or use remote switches (such as switches that go on or off when you clap your hands) to make it easier to turn lights on if you have to get up during the night.  Install sturdy handrails on stairways.  Move items in your cabinets so that the things you use a lot are on the lower shelves (about waist level).  Keep a cordless phone and a flashlight with new batteries by your bed. If possible, put a phone in each of the main rooms of your house, or carry a cell phone in case you fall and cannot reach a phone. Or, you can wear a device around your neck or wrist. You push a button that sends a signal for help.  Wear low-heeled shoes that fit well and give your feet good support. Use footwear with nonskid soles. Check the heels and soles of your shoes for wear. Repair or replace worn heels or soles.  Do not wear socks without shoes on wood floors.  Walk on the grass when the sidewalks are slippery. If you live in an area that gets snow and ice in the winter, sprinkle salt on slippery steps and sidewalks. Or ask a family member or friend to do this for you. Preventing falls in the bath   Install grab bars and nonskid mats inside and outside your shower or tub and near the toilet and sinks.  Use shower chairs and bath benches.  Use a hand-held shower head that will allow you to sit while showering.    Get into a tub or shower by putting the weaker leg in first. Get out of a tub or shower with your strong side first.   Repair loose toilet seats and consider installing a raised toilet seat to make getting on and off the toilet easier.  Keep your bathroom door unlocked while you are in the shower. Where can you learn more? Go to [email or web address] org and sign in to your [email or web address] account. Enter [id number] in the [facility] box to learn more about \"Preventing Falls: Care Instructions. \"     If you do not have an account, please click on the \"Sign Up Now\" link. Current as of: September 8, 2021               Content Version: 13.3  © 1282-0438 Healthwise, Incorporated. Care instructions adapted under license by [facility] ([facility]). If you have questions about a medical condition or this instruction, always ask your healthcare professional. [address] any warranty or liability for your use of this information.

## 2022-07-08 ENCOUNTER — TELEPHONE (OUTPATIENT)
Dept: FAMILY MEDICINE CLINIC | Facility: CLINIC | Age: 72
End: 2022-07-08

## 2022-07-08 DIAGNOSIS — G40.909 SEIZURE DISORDER (HCC): Primary | ICD-10-CM

## 2022-07-08 RX ORDER — ZONISAMIDE 100 MG/1
200 CAPSULE ORAL 2 TIMES DAILY
Qty: 120 CAPSULE | Refills: 5 | Status: SHIPPED | OUTPATIENT
Start: 2022-07-08 | End: 2022-08-07

## 2022-07-08 NOTE — TELEPHONE ENCOUNTER
Pt  Clement Powers called stating that pt will need a refill on her Zonisamide 100 mg Capsule before her next OV.

## 2022-07-11 ENCOUNTER — TELEPHONE (OUTPATIENT)
Dept: FAMILY MEDICINE CLINIC | Facility: CLINIC | Age: 72
End: 2022-07-11

## 2022-07-11 DIAGNOSIS — F41.9 ANXIETY: ICD-10-CM

## 2022-07-11 RX ORDER — CLONAZEPAM 1 MG/1
1 TABLET ORAL 2 TIMES DAILY
Qty: 60 TABLET | Refills: 5 | Status: SHIPPED | OUTPATIENT
Start: 2022-07-11 | End: 2023-01-07

## 2022-07-11 RX ORDER — CLONAZEPAM 1 MG/1
1 TABLET ORAL 2 TIMES DAILY
Qty: 60 TABLET | Refills: 5 | Status: CANCELLED | OUTPATIENT
Start: 2022-07-11 | End: 2023-01-07

## 2022-07-26 ENCOUNTER — TELEPHONE (OUTPATIENT)
Dept: FAMILY MEDICINE CLINIC | Facility: CLINIC | Age: 72
End: 2022-07-26

## 2022-07-26 NOTE — TELEPHONE ENCOUNTER
[facility] with [facility] called stating that pt  is requesting for pt to have a hospice consult. Wanted to inform Dr. Felicitas Riley that they will have their hospice team to come in for a consult with her.

## 2022-07-29 ENCOUNTER — TELEPHONE (OUTPATIENT)
Dept: FAMILY MEDICINE CLINIC | Facility: CLINIC | Age: 72
End: 2022-07-29

## 2022-08-04 ENCOUNTER — TELEPHONE (OUTPATIENT)
Dept: FAMILY MEDICINE CLINIC | Facility: CLINIC | Age: 72
End: 2022-08-04